# Patient Record
Sex: FEMALE | Race: WHITE | NOT HISPANIC OR LATINO
[De-identification: names, ages, dates, MRNs, and addresses within clinical notes are randomized per-mention and may not be internally consistent; named-entity substitution may affect disease eponyms.]

---

## 2019-10-17 ENCOUNTER — NON-APPOINTMENT (OUTPATIENT)
Age: 54
End: 2019-10-17

## 2019-10-17 ENCOUNTER — APPOINTMENT (OUTPATIENT)
Dept: OPHTHALMOLOGY | Facility: CLINIC | Age: 54
End: 2019-10-17
Payer: COMMERCIAL

## 2019-10-17 PROCEDURE — 99201 OFFICE OUTPATIENT NEW 10 MINUTES: CPT

## 2019-11-07 ENCOUNTER — RECORD ABSTRACTING (OUTPATIENT)
Age: 54
End: 2019-11-07

## 2019-11-07 DIAGNOSIS — J45.909 UNSPECIFIED ASTHMA, UNCOMPLICATED: ICD-10-CM

## 2019-11-07 DIAGNOSIS — Z80.3 FAMILY HISTORY OF MALIGNANT NEOPLASM OF BREAST: ICD-10-CM

## 2019-11-07 DIAGNOSIS — R39.11 HESITANCY OF MICTURITION: ICD-10-CM

## 2019-11-07 DIAGNOSIS — R73.03 PREDIABETES.: ICD-10-CM

## 2019-11-07 DIAGNOSIS — F32.9 MAJOR DEPRESSIVE DISORDER, SINGLE EPISODE, UNSPECIFIED: ICD-10-CM

## 2019-11-07 DIAGNOSIS — Q56.4 INDETERMINATE SEX, UNSPECIFIED: ICD-10-CM

## 2019-11-07 DIAGNOSIS — Z78.9 OTHER SPECIFIED HEALTH STATUS: ICD-10-CM

## 2019-11-07 DIAGNOSIS — Z87.442 PERSONAL HISTORY OF URINARY CALCULI: ICD-10-CM

## 2019-11-07 DIAGNOSIS — Z82.49 FAMILY HISTORY OF ISCHEMIC HEART DISEASE AND OTHER DISEASES OF THE CIRCULATORY SYSTEM: ICD-10-CM

## 2019-11-07 DIAGNOSIS — R35.0 FREQUENCY OF MICTURITION: ICD-10-CM

## 2019-11-07 RX ORDER — SERTRALINE HYDROCHLORIDE 25 MG/1
TABLET, FILM COATED ORAL
Refills: 0 | Status: ACTIVE | COMMUNITY

## 2019-11-07 RX ORDER — ARIPIPRAZOLE 2 MG/1
TABLET ORAL
Refills: 0 | Status: ACTIVE | COMMUNITY

## 2019-11-20 ENCOUNTER — APPOINTMENT (OUTPATIENT)
Dept: UROLOGY | Facility: CLINIC | Age: 54
End: 2019-11-20
Payer: COMMERCIAL

## 2019-11-20 DIAGNOSIS — Z80.0 FAMILY HISTORY OF MALIGNANT NEOPLASM OF DIGESTIVE ORGANS: ICD-10-CM

## 2019-11-20 DIAGNOSIS — N20.9 URINARY CALCULUS, UNSPECIFIED: ICD-10-CM

## 2019-11-20 DIAGNOSIS — Z00.00 ENCOUNTER FOR GENERAL ADULT MEDICAL EXAMINATION W/OUT ABNORMAL FINDINGS: ICD-10-CM

## 2019-11-20 DIAGNOSIS — R35.1 NOCTURIA: ICD-10-CM

## 2019-11-20 DIAGNOSIS — Z87.448 PERSONAL HISTORY OF OTHER DISEASES OF URINARY SYSTEM: ICD-10-CM

## 2019-11-20 LAB
BILIRUB UR QL STRIP: NORMAL
CLARITY UR: CLEAR
COLLECTION METHOD: NORMAL
GLUCOSE UR-MCNC: NORMAL
HCG UR QL: 0.2 EU/DL
HGB UR QL STRIP.AUTO: NORMAL
KETONES UR-MCNC: NORMAL
LEUKOCYTE ESTERASE UR QL STRIP: NORMAL
NITRITE UR QL STRIP: NORMAL
PH UR STRIP: 5.5
PROT UR STRIP-MCNC: NORMAL
SP GR UR STRIP: 1.02

## 2019-11-20 PROCEDURE — 99215 OFFICE O/P EST HI 40 MIN: CPT | Mod: 25

## 2019-11-20 PROCEDURE — 51798 US URINE CAPACITY MEASURE: CPT

## 2019-11-20 PROCEDURE — 81003 URINALYSIS AUTO W/O SCOPE: CPT | Mod: QW

## 2019-11-20 RX ORDER — ESTROGENS, CONJUGATED 0.62 MG/1
0.62 TABLET, FILM COATED ORAL
Refills: 0 | Status: ACTIVE | COMMUNITY

## 2019-11-20 RX ORDER — PREDNISOLONE SODIUM PHOSPHATE 1 %
DROPS OPHTHALMIC (EYE)
Refills: 0 | Status: ACTIVE | COMMUNITY

## 2019-11-20 RX ORDER — PROGESTERONE 100 MG/1
100 CAPSULE ORAL
Refills: 0 | Status: ACTIVE | COMMUNITY

## 2019-11-20 RX ORDER — PROGESTERONE 200 MG/1
CAPSULE ORAL
Refills: 0 | Status: DISCONTINUED | COMMUNITY
End: 2019-11-20

## 2019-11-21 NOTE — PHYSICAL EXAM
[General Appearance - Well Developed] : well developed [General Appearance - Well Nourished] : well nourished [Normal Appearance] : normal appearance [Well Groomed] : well groomed [General Appearance - In No Acute Distress] : no acute distress [Edema] : no peripheral edema [Respiration, Rhythm And Depth] : normal respiratory rhythm and effort [Exaggerated Use Of Accessory Muscles For Inspiration] : no accessory muscle use [Abdomen Soft] : soft [Abdomen Tenderness] : non-tender [Costovertebral Angle Tenderness] : no ~M costovertebral angle tenderness [FreeTextEntry1] : patient does not want to have a  exam  [Normal Station and Gait] : the gait and station were normal for the patient's age [] : no rash [No Focal Deficits] : no focal deficits [Oriented To Time, Place, And Person] : oriented to person, place, and time [Affect] : the affect was normal [Mood] : the mood was normal [Not Anxious] : not anxious [No Palpable Adenopathy] : no palpable adenopathy

## 2019-11-21 NOTE — ASSESSMENT
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  Last imaging done in 2017- small punctate stones.  Never has required surgical intervention for stone disease- presents for follow-up.  Pt continues to have "slow stream" which she states is better after she defecates.  We discussed her bowel regimen as a means to address.  She is empyting well with minimal PVR today and does not wish to have a physical exam.  On prior examinations she does have intact prostatic tissue and it is possible her outlet is contributing to her symptoms.  To address her stone hx I have ordered an ultrasound and will be in touch with her with the results.

## 2019-11-21 NOTE — HISTORY OF PRESENT ILLNESS
[FreeTextEntry1] : 54F, , (born with intersex condition), hx of multiple prior surgical procedures in the abdomen for stated. Prior patient of Dr. Avendano. Hx of urolithiasis since 25 yrs ago, small stones which likely passed. Last visit 2017 was noted to have B/L punctate non-obstructing stones which were followed with renal US. Of note, has been on Estrogen replacement therapy since teenage years.\par \par Urinary symptoms include same sensation of incomplete bladder emptying, nocturia 1-2x. Patient reports better flow after bowel movement. Flow is "light and slow". Denies recent gross hematuria. Last UTI two months ago, completed routine antibiotics. Today's dipstick pending.

## 2019-11-26 LAB — BACTERIA UR CULT: NORMAL

## 2020-10-15 ENCOUNTER — APPOINTMENT (OUTPATIENT)
Dept: UROLOGY | Facility: CLINIC | Age: 55
End: 2020-10-15
Payer: COMMERCIAL

## 2020-10-15 VITALS — SYSTOLIC BLOOD PRESSURE: 145 MMHG | HEART RATE: 75 BPM | DIASTOLIC BLOOD PRESSURE: 90 MMHG | TEMPERATURE: 97.1 F

## 2020-10-15 PROCEDURE — 81003 URINALYSIS AUTO W/O SCOPE: CPT | Mod: QW

## 2020-10-15 PROCEDURE — 99213 OFFICE O/P EST LOW 20 MIN: CPT

## 2020-10-15 NOTE — PHYSICAL EXAM
[Normal Appearance] : normal appearance [General Appearance - Well Nourished] : well nourished [General Appearance - Well Developed] : well developed [Well Groomed] : well groomed [General Appearance - In No Acute Distress] : no acute distress [Abdomen Tenderness] : non-tender [Abdomen Soft] : soft [Costovertebral Angle Tenderness] : no ~M costovertebral angle tenderness [Edema] : no peripheral edema [Respiration, Rhythm And Depth] : normal respiratory rhythm and effort [] : no respiratory distress [Affect] : the affect was normal [Oriented To Time, Place, And Person] : oriented to person, place, and time [Exaggerated Use Of Accessory Muscles For Inspiration] : no accessory muscle use [Normal Station and Gait] : the gait and station were normal for the patient's age [Mood] : the mood was normal [Not Anxious] : not anxious [No Focal Deficits] : no focal deficits [No Palpable Adenopathy] : no palpable adenopathy

## 2020-10-16 LAB
BILIRUB UR QL STRIP: NORMAL
CLARITY UR: CLEAR
COLLECTION METHOD: NORMAL
GLUCOSE UR-MCNC: NORMAL
HCG UR QL: 0.2 EU/DL
HGB UR QL STRIP.AUTO: NORMAL
KETONES UR-MCNC: NORMAL
LEUKOCYTE ESTERASE UR QL STRIP: NORMAL
NITRITE UR QL STRIP: NORMAL
PH UR STRIP: 5.5
PROT UR STRIP-MCNC: NORMAL
SP GR UR STRIP: 1.01

## 2020-10-22 ENCOUNTER — APPOINTMENT (OUTPATIENT)
Dept: UROLOGY | Facility: CLINIC | Age: 55
End: 2020-10-22
Payer: COMMERCIAL

## 2020-10-22 VITALS
BODY MASS INDEX: 36.57 KG/M2 | DIASTOLIC BLOOD PRESSURE: 86 MMHG | HEIGHT: 72 IN | HEART RATE: 69 BPM | SYSTOLIC BLOOD PRESSURE: 127 MMHG | WEIGHT: 270 LBS | TEMPERATURE: 97.7 F

## 2020-10-22 LAB — BACTERIA UR CULT: NORMAL

## 2020-10-22 PROCEDURE — 99072 ADDL SUPL MATRL&STAF TM PHE: CPT

## 2020-10-22 PROCEDURE — 99214 OFFICE O/P EST MOD 30 MIN: CPT

## 2020-10-22 NOTE — HISTORY OF PRESENT ILLNESS
[FreeTextEntry1] : 55F, , (born intersex), hx of multiple prior surgical procedures. She is a former patient of Dr. Avendano. Hx of urolithiasis since 26 yrs ago, small stones which likely passed.  Most recent imaging demonstrated bilateral worsening hydronephrosis with right renal lower pole stone. Prior imaging completed on 2017 notable for B/L punctate non-obstructing stones which were followed with renal US. Of note, has been on Estrogen replacement therapy since teenage years. \par \par Today she reports for right toe pain. She states she woke up in the middle of the night with right great toe pain which she believes to be gout. States her toe was hot and red but has since improved. She is concerned because of her recent high uric acid levels and kidney stone formation. She has never had an episode of gout before. \par \par Urinary symptoms include same sensation of incomplete bladder emptying, nocturia 1-2x. Patient reports better flow after bowel movement. Flow is "light and slow". Denies recent gross hematuria. \par \par Urine culture 10/15/20: negative

## 2020-10-22 NOTE — ASSESSMENT
[FreeTextEntry1] : 56 yo female born intersex with hx bilateral stones who presents with recent renal US demonstrating bilateral worsening hydronephrosis with renal stone in right renal lower pole.  She has never required surgical intervention for stone disease in the past. CT abd + pelvis to evaluate Hounsfield units of stones is pending.   \par \par For toe symptoms  I have suggested she see her PMD or podiatrist.

## 2020-10-22 NOTE — PHYSICAL EXAM
[General Appearance - Well Developed] : well developed [General Appearance - Well Nourished] : well nourished [Normal Appearance] : normal appearance [Well Groomed] : well groomed [General Appearance - In No Acute Distress] : no acute distress [Abdomen Soft] : soft [Abdomen Tenderness] : non-tender [Costovertebral Angle Tenderness] : no ~M costovertebral angle tenderness [Edema] : no peripheral edema [] : no respiratory distress [Respiration, Rhythm And Depth] : normal respiratory rhythm and effort [Exaggerated Use Of Accessory Muscles For Inspiration] : no accessory muscle use [Oriented To Time, Place, And Person] : oriented to person, place, and time [Affect] : the affect was normal [Mood] : the mood was normal [Not Anxious] : not anxious [No Focal Deficits] : no focal deficits [FreeTextEntry1] : Right mildly tender great metatarsal joint, not erythematous,cool to touch

## 2020-11-03 ENCOUNTER — NON-APPOINTMENT (OUTPATIENT)
Age: 55
End: 2020-11-03

## 2020-12-03 NOTE — ASSESSMENT
[FreeTextEntry1] : 56 yo female born intersex with hx bilateral stones who presents with recent renal US demonstrating bilateral worsening hydronephrosis with renal stone in right renal lower pole.  She has never required surgical intervention for stone disease in the past.  Pt continues to have "slow stream" which she states is better after she defecates.  Will send urine for culture today. She has intact prostatic tissue, will schedule CT abd + pelvis to evaluate Hounsfield units of stones, will call pt with results to further discuss options for stone treatment.

## 2020-12-03 NOTE — HISTORY OF PRESENT ILLNESS
[FreeTextEntry1] : 55F, , (born with intersex condition), hx of multiple prior surgical procedures in the abdomen. Prior patient of Dr. Avendano. Hx of urolithiasis since 26 yrs ago, small stones which likely passed.  Most recent imaging demonstrated bilateral worsening hydronephrosis with right renal lower pole stone. Prior imaging completed on 2017 notable for B/L punctate non-obstructing stones which were followed with renal US. Of note, has been on Estrogen replacement therapy since teenage years. \par \par Urinary symptoms include same sensation of incomplete bladder emptying, nocturia 1-2x. Patient reports better flow after bowel movement. Flow is "light and slow". Denies recent gross hematuria.

## 2020-12-07 ENCOUNTER — APPOINTMENT (OUTPATIENT)
Dept: UROLOGY | Facility: CLINIC | Age: 55
End: 2020-12-07
Payer: COMMERCIAL

## 2020-12-07 PROCEDURE — 99214 OFFICE O/P EST MOD 30 MIN: CPT | Mod: 95

## 2020-12-07 RX ORDER — POTASSIUM CITRATE 15 MEQ/1
15 MEQ TABLET, EXTENDED RELEASE ORAL
Qty: 60 | Refills: 11 | Status: ACTIVE | COMMUNITY
Start: 2020-12-07 | End: 1900-01-01

## 2020-12-09 NOTE — ASSESSMENT
[FreeTextEntry1] : 56 yo female born intersex with recent CT scan demonstrating R>L extrarenal pelves and multiple small 3-4 mm stones in right kidney.  We discussed options.  Given her urethral anatomy, she is concerned about the ability to have an urgent procedure should the need arise for one of these small stones.  Given the density of the stones, I have started her on potassium citrate and plan to repeat a CT non-contrast in 3 months.

## 2020-12-09 NOTE — HISTORY OF PRESENT ILLNESS
[Home] : at home, [unfilled] , at the time of the visit. [Medical Office: ([facility])___] : at the medical office located in  [Verbal consent obtained from patient] : the patient, [unfilled] [FreeTextEntry1] : 55F, , (born intersex), hx of multiple prior surgical procedures. She presents via telehealth to discuss recent 24 hour urine results.  \par \par History: \par She is a former patient of Dr. Avendano. Hx of urolithiasis >25 yrs ago, small stones which likely passed.  Most recent imaging demonstrated bilateral worsening hydronephrosis with right renal lower pole stone. Prior imaging completed on 2017 notable for B/L punctate non-obstructing stones which were followed with renal US. Of note, has been on Estrogen replacement therapy since teenage years. \par \par Urinary symptoms include same sensation of incomplete bladder emptying, nocturia 1-2x. Patient reports better flow after bowel movement. Flow is "light and slow". Denies recent gross hematuria. \par \par Urine culture 10/15/20: negative

## 2021-02-27 ENCOUNTER — NON-APPOINTMENT (OUTPATIENT)
Age: 56
End: 2021-02-27

## 2021-03-11 ENCOUNTER — APPOINTMENT (OUTPATIENT)
Dept: UROLOGY | Facility: CLINIC | Age: 56
End: 2021-03-11
Payer: COMMERCIAL

## 2021-03-11 PROCEDURE — 99214 OFFICE O/P EST MOD 30 MIN: CPT | Mod: 95

## 2021-03-11 NOTE — HISTORY OF PRESENT ILLNESS
[Other Location: e.g. School (Enter Location, City,State)___] : at [unfilled], at the time of the visit. [FreeTextEntry1] : 55F, , (born intersex), hx of multiple prior surgical procedures. She presented 2020 via telehealth to discuss recent 24 hour urine results. Given the density of the stones, started her on potassium citrate. She returns to discuss abd+pelvis CT scan results 2021 which demonstrates that stones have not changed in size. Had one episode of heartburn secondary to potassium citrate last week. Has been taking potassium citrate BID until this episode and has not taken it since. Of note, pt is having a double mastectomy in two weeks. She is not currently taking estrogen, but is still taking progesterone. \par \par CT abd+ pelvis 2021: 4 mm right mid kidney nonobstructing stone, 3 mm right lower pole nonobstructing stone, 1 mm right mid kidney/lower pole nonobstructing stone,and 3 additional 1 mm right upper pole renal stones. Two upper pole 3 mm nonobstructing stones. \par 24 hour urine collection 2020: 332 mg/day urine calcium, , 0.866 uric acid (g/day)\par \par History: \par She is a former patient of Dr. Avendano. Hx of urolithiasis >25 yrs ago, small stones which likely passed.  Most recent imaging demonstrated bilateral worsening hydronephrosis with right renal lower pole stone. Prior imaging completed on 2017 notable for B/L punctate non-obstructing stones which were followed with renal US. Of note, has been on Estrogen replacement therapy since teenage years. \par \par Urinary symptoms include same sensation of incomplete bladder emptying, nocturia 1-2x. Patient reported better flow after bowel movement. Flow was "light and slow". Denied recent gross hematuria. \par

## 2021-03-11 NOTE — LETTER BODY
[Dear  ___] : Dear  [unfilled], [Consult Letter:] : I had the pleasure of evaluating your patient, [unfilled]. [Please see my note below.] : Please see my note below. [Consult Closing:] : Thank you very much for allowing me to participate in the care of this patient.  If you have any questions, please do not hesitate to contact me. [Sincerely,] : Sincerely, [FreeTextEntry3] : Dr. Gisella Gutierres

## 2021-03-11 NOTE — ASSESSMENT
[FreeTextEntry1] : 54 yo female born intersex with initial CT scan demonstrating R>L extrarenal pelves and multiple small 3-4 mm stones in right kidney. We discussed options, she wishes to d/c potassium citrate until after surgery. She will repeat 24 urine collection and schedule televisit to discuss results. \par \par Patient expressed understanding. \par

## 2021-03-17 ENCOUNTER — APPOINTMENT (OUTPATIENT)
Dept: OPHTHALMOLOGY | Facility: CLINIC | Age: 56
End: 2021-03-17
Payer: COMMERCIAL

## 2021-03-17 ENCOUNTER — NON-APPOINTMENT (OUTPATIENT)
Age: 56
End: 2021-03-17

## 2021-03-17 PROCEDURE — 92025 CPTRIZED CORNEAL TOPOGRAPHY: CPT

## 2021-03-17 PROCEDURE — 92286 ANT SGM IMG I&R SPECLR MIC: CPT

## 2021-03-17 PROCEDURE — 92014 COMPRE OPH EXAM EST PT 1/>: CPT

## 2021-03-17 PROCEDURE — 99072 ADDL SUPL MATRL&STAF TM PHE: CPT

## 2021-03-17 PROCEDURE — 92250 FUNDUS PHOTOGRAPHY W/I&R: CPT

## 2021-04-14 ENCOUNTER — APPOINTMENT (OUTPATIENT)
Dept: UROLOGY | Facility: CLINIC | Age: 56
End: 2021-04-14
Payer: COMMERCIAL

## 2021-04-14 VITALS — SYSTOLIC BLOOD PRESSURE: 157 MMHG | DIASTOLIC BLOOD PRESSURE: 103 MMHG | HEART RATE: 65 BPM | TEMPERATURE: 97.9 F

## 2021-04-14 LAB
BILIRUB UR QL STRIP: NORMAL
CLARITY UR: CLEAR
COLLECTION METHOD: NORMAL
GLUCOSE UR-MCNC: NORMAL
HCG UR QL: 0.2 EU/DL
HGB UR QL STRIP.AUTO: NORMAL
KETONES UR-MCNC: NORMAL
LEUKOCYTE ESTERASE UR QL STRIP: NORMAL
NITRITE UR QL STRIP: NORMAL
PH UR STRIP: 6
PROT UR STRIP-MCNC: NORMAL
SP GR UR STRIP: 1.01

## 2021-04-14 PROCEDURE — 99072 ADDL SUPL MATRL&STAF TM PHE: CPT

## 2021-04-14 PROCEDURE — 99215 OFFICE O/P EST HI 40 MIN: CPT

## 2021-04-14 PROCEDURE — 51798 US URINE CAPACITY MEASURE: CPT

## 2021-04-14 NOTE — PHYSICAL EXAM
[General Appearance - Well Developed] : well developed [General Appearance - Well Nourished] : well nourished [Normal Appearance] : normal appearance [Well Groomed] : well groomed [General Appearance - In No Acute Distress] : no acute distress [Edema] : no peripheral edema [] : no respiratory distress [Exaggerated Use Of Accessory Muscles For Inspiration] : no accessory muscle use [Oriented To Time, Place, And Person] : oriented to person, place, and time [Affect] : the affect was normal [Mood] : the mood was normal [Not Anxious] : not anxious [Normal Station and Gait] : the gait and station were normal for the patient's age [No Focal Deficits] : no focal deficits

## 2021-04-15 ENCOUNTER — APPOINTMENT (OUTPATIENT)
Dept: UROLOGY | Facility: CLINIC | Age: 56
End: 2021-04-15

## 2021-04-16 RX ORDER — CIPROFLOXACIN HYDROCHLORIDE 500 MG/1
500 TABLET, FILM COATED ORAL
Qty: 6 | Refills: 0 | Status: ACTIVE | COMMUNITY
Start: 2021-04-16 | End: 1900-01-01

## 2021-04-20 NOTE — HISTORY OF PRESENT ILLNESS
[FreeTextEntry1] : 55F, , (born intersex), hx of multiple prior surgical procedures presenting with complaints of a urinary tract infection that began four days s/p double mastectomy.  Denies catheter placement at time of surgery. Reports constipation s/p surgery.  Once UTI symptoms began, she self-medicated with Macrobid BID x 7 days, which moderately improved symptoms at the time. When symptoms returned, she presented to a [facility] 2021 and UCx (+) for E.coli. UA at this time also significant for microscopic hematuria. She was then treated with Macrobid qd x 6 days. She is on sixth day of abx today and still reports UTI symptoms. Last UTI was 6 months ago.\par \par Pt also has longstanding incomplete bladder emptying complaints with worsening weak flow. Reports straining to void with full bladder. She states that she has no upcoming surgery and is no longer on hormone replacement therapy. Denies atrophy or hot flashes. \par \par Udip: negative\par CT abd+ pelvis 2021: 4 mm right mid kidney nonobstructing stone, 3 mm right lower pole nonobstructing stone, 1 mm right mid kidney/lower pole nonobstructing stone,and 3 additional 1 mm right upper pole renal stones. Two upper pole 3 mm nonobstructing stones. \par 24 hour urine collection 2020: 332 mg/day urine calcium, , 0.866 uric acid (g/day)\par \par History: \par She is a former patient of Dr. Avendano. Hx of urolithiasis >25 yrs ago, small stones which likely passed.  Most recent imaging demonstrated bilateral worsening hydronephrosis with right renal lower pole stone. Prior imaging completed on 2017 notable for B/L punctate non-obstructing stones which were followed with renal US. Of note, has been on Estrogen replacement therapy since teenage years. \par \par Urinary symptoms include same sensation of incomplete bladder emptying, nocturia 1-2x. Patient reported better flow after bowel movement. Flow was "light and slow". Denied recent gross hematuria. \par \par She presented 2020 via telehealth to discuss recent 24 hour urine results. Given the density of the stones, started her on potassium citrate. She returned 3/11/2021 to discuss abd+pelvis CT scan results 2021 which demonstrated that stones have not changed in size. Had one episode of heartburn secondary to potassium citrate week prior. Had been taking potassium citrate BID until this episode and has not taken it since. Of note, pt was having a double mastectomy in two weeks. She was not taking estrogen, but was still taking progesterone. \par

## 2021-04-20 NOTE — ASSESSMENT
[FreeTextEntry1] : 54 yo female born intersex with initial CT scan demonstrating R>L extrarenal pelves and multiple small 3-4 mm stones in right kidney. She returns with complaints of a urinary tract infection that began four days s/p double mastectomy. Will send urine for culture and send rx Cipro to pharmacy. She will have a repeat UCx at outside lab Friday. If still symptomatic at this point, she will start abx to empirically treat UTI while awaiting UCx results.\par \par We discussed behavioral and dietary modifications to prevent further stone formation as well as manual compression of bladder during void. We will recheck PVR and discuss next steps in 2-3 months. Pt understands possibility of cystoscopy if microscopic hematuria continues outside setting of infection.\par \par Patient expressed understanding. \par \par PVR #1: 185 cc\par PVR #2: 68 cc

## 2021-04-22 ENCOUNTER — APPOINTMENT (OUTPATIENT)
Dept: UROLOGY | Facility: CLINIC | Age: 56
End: 2021-04-22
Payer: COMMERCIAL

## 2021-04-22 VITALS — TEMPERATURE: 97.8 F

## 2021-04-22 PROCEDURE — 99214 OFFICE O/P EST MOD 30 MIN: CPT

## 2021-04-22 PROCEDURE — 99072 ADDL SUPL MATRL&STAF TM PHE: CPT

## 2021-04-22 RX ORDER — TAMSULOSIN HYDROCHLORIDE 0.4 MG/1
0.4 CAPSULE ORAL
Qty: 10 | Refills: 1 | Status: ACTIVE | COMMUNITY
Start: 2021-04-22 | End: 1900-01-01

## 2021-04-22 NOTE — PHYSICAL EXAM
[General Appearance - Well Developed] : well developed [General Appearance - Well Nourished] : well nourished [Normal Appearance] : normal appearance [Well Groomed] : well groomed [General Appearance - In No Acute Distress] : no acute distress [Edema] : no peripheral edema [] : no respiratory distress [Exaggerated Use Of Accessory Muscles For Inspiration] : no accessory muscle use [Affect] : the affect was normal [Oriented To Time, Place, And Person] : oriented to person, place, and time [Mood] : the mood was normal [Not Anxious] : not anxious [Normal Station and Gait] : the gait and station were normal for the patient's age [No Focal Deficits] : no focal deficits

## 2021-04-26 ENCOUNTER — NON-APPOINTMENT (OUTPATIENT)
Age: 56
End: 2021-04-26

## 2021-04-26 LAB
BACTERIA UR CULT: NORMAL
BILIRUB UR QL STRIP: NORMAL
CLARITY UR: CLEAR
COLLECTION METHOD: NORMAL
GLUCOSE UR-MCNC: NORMAL
HCG UR QL: 0.2 EU/DL
HGB UR QL STRIP.AUTO: NORMAL
KETONES UR-MCNC: NORMAL
LEUKOCYTE ESTERASE UR QL STRIP: NORMAL
NITRITE UR QL STRIP: NORMAL
PH UR STRIP: 5.5
PROT UR STRIP-MCNC: NORMAL
SP GR UR STRIP: 1.01

## 2021-04-26 NOTE — ASSESSMENT
[FreeTextEntry1] : 54 yo female born intersex with initial CT scan demonstrating R>L extrarenal pelves and multiple small 3-4 mm stones in right kidney. Will send urine via straight cath sample for culture. Pt will call Monday for the results. We agreed on a short course of Flomax to see if that helped with bladder emptying complaints.  \par \par Patient expressed understanding. \par

## 2021-04-26 NOTE — HISTORY OF PRESENT ILLNESS
[FreeTextEntry1] : 55F, , (born intersex), hx of multiple prior surgical procedures presenting for straight catheterization. \par \par Udip: negative\par UCx 2021: negative\par CT abd+ pelvis 2021: 4 mm right mid kidney nonobstructing stone, 3 mm right lower pole nonobstructing stone, 1 mm right mid kidney/lower pole nonobstructing stone,and 3 additional 1 mm right upper pole renal stones. Two upper pole 3 mm nonobstructing stones. \par 24 hour urine collection 2020: 332 mg/day urine calcium, , 0.866 uric acid (g/day)\par \par History: \par She is a former patient of Dr. Avendano. Hx of urolithiasis >25 yrs ago, small stones which likely passed.  Most recent imaging demonstrated bilateral worsening hydronephrosis with right renal lower pole stone. Prior imaging completed on 2017 notable for B/L punctate non-obstructing stones which were followed with renal US. Of note, has been on Estrogen replacement therapy since teenage years. \par \par Urinary symptoms include same sensation of incomplete bladder emptying, nocturia 1-2x. Patient reported better flow after bowel movement. Flow was "light and slow". Denied recent gross hematuria. \par \par She presented 2020 via telehealth to discuss recent 24 hour urine results. Given the density of the stones, started her on potassium citrate. She returned 3/11/2021 to discuss abd+pelvis CT scan results 2021 which demonstrated that stones have not changed in size. Had one episode of heartburn secondary to potassium citrate week prior. Had been taking potassium citrate BID until this episode and has not taken it since. Of note, pt was having a double mastectomy in two weeks. She was not taking estrogen, but was still taking progesterone. \par \par 2021 she presented with complaints of a urinary tract infection that began four days s/p double mastectomy.  Denied catheter placement at time of surgery. Reported constipation s/p surgery.  Once UTI symptoms began, she self-medicated with Macrobid BID x 7 days, which moderately improved symptoms at the time. When symptoms returned, she presented to a [facility] 2021 and UCx (+) for E.coli. UA at this time also significant for microscopic hematuria. She was then treated with Macrobid qd x 6 days. She was on sixth day of abx and still reported UTI symptoms. Last UTI was 6 months ago. She was empirically treated with 500 mg BID Cipro x 3 days. \par \par Pt also has longstanding incomplete bladder emptying complaints with worsening weak flow. Reported straining to void with full bladder. She stated that she has no upcoming surgery and is no longer on hormone replacement therapy. Denied atrophy or hot flashes. \par

## 2021-04-29 ENCOUNTER — APPOINTMENT (OUTPATIENT)
Dept: UROLOGY | Facility: CLINIC | Age: 56
End: 2021-04-29
Payer: COMMERCIAL

## 2021-04-29 VITALS — DIASTOLIC BLOOD PRESSURE: 89 MMHG | TEMPERATURE: 97.8 F | SYSTOLIC BLOOD PRESSURE: 151 MMHG | HEART RATE: 80 BPM

## 2021-04-29 PROCEDURE — 99072 ADDL SUPL MATRL&STAF TM PHE: CPT

## 2021-04-29 PROCEDURE — 51797 INTRAABDOMINAL PRESSURE TEST: CPT

## 2021-04-29 PROCEDURE — 51741 ELECTRO-UROFLOWMETRY FIRST: CPT

## 2021-04-29 PROCEDURE — 51784 ANAL/URINARY MUSCLE STUDY: CPT

## 2021-04-29 PROCEDURE — 51728 CYSTOMETROGRAM W/VP: CPT

## 2021-04-29 PROCEDURE — 99215 OFFICE O/P EST HI 40 MIN: CPT | Mod: 25

## 2021-04-29 RX ORDER — NITROFURANTOIN (MONOHYDRATE/MACROCRYSTALS) 25; 75 MG/1; MG/1
100 CAPSULE ORAL
Qty: 10 | Refills: 6 | Status: ACTIVE | COMMUNITY
Start: 2021-04-29 | End: 1900-01-01

## 2021-04-30 LAB
BILIRUB UR QL STRIP: NORMAL
CLARITY UR: CLEAR
COLLECTION METHOD: NORMAL
GLUCOSE UR-MCNC: NORMAL
HCG UR QL: 0.2 EU/DL
HGB UR QL STRIP.AUTO: NORMAL
KETONES UR-MCNC: NORMAL
LEUKOCYTE ESTERASE UR QL STRIP: NORMAL
NITRITE UR QL STRIP: NORMAL
PH UR STRIP: 7
PROT UR STRIP-MCNC: NORMAL
SP GR UR STRIP: 1.01

## 2021-05-04 LAB — BACTERIA UR CULT: NORMAL

## 2021-05-05 NOTE — HISTORY OF PRESENT ILLNESS
[FreeTextEntry1] : 55F, , (born intersex), hx of multiple prior surgical procedures presenting for urodynamics. \par \par Udip: negative\par UCx 2021: negative\par CT abd+ pelvis 2021: 4 mm right mid kidney nonobstructing stone, 3 mm right lower pole nonobstructing stone, 1 mm right mid kidney/lower pole nonobstructing stone,and 3 additional 1 mm right upper pole renal stones. Two upper pole 3 mm nonobstructing stones. \par 24 hour urine collection 2020: 332 mg/day urine calcium, , 0.866 uric acid (g/day)\par \par History: \par She is a former patient of Dr. Avendano. Hx of urolithiasis >25 yrs ago, small stones which likely passed.  Most recent imaging demonstrated bilateral worsening hydronephrosis with right renal lower pole stone. Prior imaging completed on 2017 notable for B/L punctate non-obstructing stones which were followed with renal US. Of note, has been on Estrogen replacement therapy since teenage years. \par \par Urinary symptoms include same sensation of incomplete bladder emptying, nocturia 1-2x. Patient reported better flow after bowel movement. Flow was "light and slow". Denied recent gross hematuria. \par \par She presented 2020 via telehealth to discuss recent 24 hour urine results. Given the density of the stones, started her on potassium citrate. She returned 3/11/2021 to discuss abd+pelvis CT scan results 2021 which demonstrated that stones have not changed in size. Had one episode of heartburn secondary to potassium citrate week prior. Had been taking potassium citrate BID until this episode and has not taken it since. Of note, pt was having a double mastectomy in two weeks. She was not taking estrogen, but was still taking progesterone. \par \par 2021 she presented with complaints of a urinary tract infection that began four days s/p double mastectomy.  Denied catheter placement at time of surgery. Reported constipation s/p surgery.  Once UTI symptoms began, she self-medicated with Macrobid BID x 7 days, which moderately improved symptoms at the time. When symptoms returned, she presented to a [facility] 2021 and UCx (+) for E.coli. UA at this time also significant for microscopic hematuria. She was then treated with Macrobid qd x 6 days. She was on sixth day of abx and still reported UTI symptoms. Last UTI was 6 months ago. She was empirically treated with 500 mg BID Cipro x 3 days. \par \par Pt also has longstanding incomplete bladder emptying complaints with worsening weak flow. Reported straining to void with full bladder. She stated that she has no upcoming surgery and is no longer on hormone replacement therapy. Denied atrophy or hot flashes. \par \par 2021: She presented for straight catheterization. We agreed on a short course of Flomax to see if that helped with bladder emptying complaints.  Pt later called moderate improvement on Flomax, specified worse when constipating.

## 2021-05-05 NOTE — ASSESSMENT
[FreeTextEntry1] : 54 yo female born intersex with initial CT scan demonstrating R>L extrarenal pelves and multiple small 3-4 mm stones in right kidney. She returns for urodynamics. Urodynamics demonstrated a large capacity bladder with an interrupted flow.   Evidence of valsalva voiding and impaired contractility.   The flomax does not appear to be helping much so she will stop.  I will be in touch with the urine culture results.  \par \par She tolerated the procedure well. \par \par Patient expressed understanding. \par

## 2021-05-06 ENCOUNTER — APPOINTMENT (OUTPATIENT)
Dept: UROLOGY | Facility: CLINIC | Age: 56
End: 2021-05-06
Payer: COMMERCIAL

## 2021-05-06 PROCEDURE — 99213 OFFICE O/P EST LOW 20 MIN: CPT | Mod: 95

## 2021-05-06 NOTE — ASSESSMENT
[FreeTextEntry1] : 56 yo female born intersex with initial CT scan demonstrating R>L extrarenal pelves and multiple small 3-4 mm stones in right kidney. Urodynamics demonstrated a large capacity bladder with an interrupted flow. Evidence of valsalva voiding and impaired contractility.  She reports improvement in bladder symptoms. She will continue taking MiraLAX to aid BM, avoid bladder irritants, and drink plenty of fluids. She will return in a month to recheck PVR. \par \par Patient expressed understanding. \par

## 2021-05-06 NOTE — HISTORY OF PRESENT ILLNESS
[Other Location: e.g. School (Enter Location, City,State)___] : at [unfilled], at the time of the visit. [Verbal consent obtained from patient] : the patient, [unfilled] [FreeTextEntry1] : 55F, , (born intersex), hx of multiple prior surgical procedures presenting for follow-up. She states that she was constipated yesterday and felt pubic discomfort, unsure if discomfort was vaginal or urinary. Reports discharge as well. She has since had several BM and no longer feels as "distended." She feels well today and states that she is emptying well. \par \par UCx 2021: negative\par CT abd+ pelvis 2021: 4 mm right mid kidney nonobstructing stone, 3 mm right lower pole nonobstructing stone, 1 mm right mid kidney/lower pole nonobstructing stone,and 3 additional 1 mm right upper pole renal stones. Two upper pole 3 mm nonobstructing stones. \par 24 hour urine collection 2020: 332 mg/day urine calcium, , 0.866 uric acid (g/day)\par \par History: \par She is a former patient of Dr. Avendano. Hx of urolithiasis >25 yrs ago, small stones which likely passed.  Most recent imaging demonstrated bilateral worsening hydronephrosis with right renal lower pole stone. Prior imaging completed on 2017 notable for B/L punctate non-obstructing stones which were followed with renal US. Of note, has been on Estrogen replacement therapy since teenage years. \par \par Urinary symptoms include same sensation of incomplete bladder emptying, nocturia 1-2x. Patient reported better flow after bowel movement. Flow was "light and slow". Denied recent gross hematuria. \par \par She presented 2020 via telehealth to discuss recent 24 hour urine results. Given the density of the stones, started her on potassium citrate. She returned 3/11/2021 to discuss abd+pelvis CT scan results 2021 which demonstrated that stones have not changed in size. Had one episode of heartburn secondary to potassium citrate week prior. Had been taking potassium citrate BID until this episode and has not taken it since. Of note, pt was having a double mastectomy in two weeks. She was not taking estrogen, but was still taking progesterone. \par \par 2021 she presented with complaints of a urinary tract infection that began four days s/p double mastectomy.  Denied catheter placement at time of surgery. Reported constipation s/p surgery.  Once UTI symptoms began, she self-medicated with Macrobid BID x 7 days, which moderately improved symptoms at the time. When symptoms returned, she presented to a [facility] 2021 and UCx (+) for E.coli. UA at this time also significant for microscopic hematuria. She was then treated with Macrobid qd x 6 days. She was on sixth day of abx and still reported UTI symptoms. Last UTI was 6 months ago. She was empirically treated with 500 mg BID Cipro x 3 days. \par \par Pt also has longstanding incomplete bladder emptying complaints with worsening weak flow. Reported straining to void with full bladder. She stated that she has no upcoming surgery and is no longer on hormone replacement therapy. Denied atrophy or hot flashes. \par \par 2021: She presented for straight catheterization. We agreed on a short course of Flomax to see if that helped with bladder emptying complaints.  Pt later called moderate improvement on Flomax, specified worse when constipated.\par \par  2021 she presented for urodynamics. Urodynamics demonstrated a large capacity bladder with an interrupted flow. Evidence of valsalva voiding and impaired contractility.  The flomax does not appear to be helping much so she will stop. \par

## 2021-06-08 ENCOUNTER — APPOINTMENT (OUTPATIENT)
Dept: UROLOGY | Facility: CLINIC | Age: 56
End: 2021-06-08
Payer: COMMERCIAL

## 2021-06-08 ENCOUNTER — RESULT CHARGE (OUTPATIENT)
Age: 56
End: 2021-06-08

## 2021-06-08 VITALS
SYSTOLIC BLOOD PRESSURE: 138 MMHG | HEART RATE: 74 BPM | OXYGEN SATURATION: 97 % | DIASTOLIC BLOOD PRESSURE: 87 MMHG | TEMPERATURE: 97.2 F

## 2021-06-08 PROCEDURE — 99213 OFFICE O/P EST LOW 20 MIN: CPT

## 2021-06-08 PROCEDURE — 51798 US URINE CAPACITY MEASURE: CPT

## 2021-06-09 NOTE — ASSESSMENT
[FreeTextEntry1] : 54 yo female born intersex with initial CT scan demonstrating R>L extrarenal pelves and multiple small 3-4 mm stones in right kidney. Recent recurrent UTI's and worsening incomplete bladder emptying now resolved.  Urodynamics demonstrated a large capacity bladder with an interrupted flow. Evidence of valsalva voiding and impaired contractility. Will send urine for culture to confirm no infection. \par \par Encouraged him to f/u with genital reassignment surgeon for further evaluation/discussion re options. \par \par Patient expressed understanding. \par \par PVR: 12 cc (done to rule out incomplete bladder emptying) \par

## 2021-06-09 NOTE — HISTORY OF PRESENT ILLNESS
[FreeTextEntry1] : 55F, , (born intersex), hx of multiple prior surgical procedures presenting to check PVR. He is feeling well today with no current urinary complaints. He has been taking MiraLAX in setting constipation and reports improvement in BM.  Reports improvement in mental health s/p bilateral mastectomy. He questions if he can pursue phalloplasty with current genitourinary anatomy. \par \par Of note, pt prefers to be called Shayan.\par \par Udip: negative\par UCx 2021: negative\par CT abd+ pelvis 2021: 4 mm right mid kidney nonobstructing stone, 3 mm right lower pole nonobstructing stone, 1 mm right mid kidney/lower pole nonobstructing stone,and 3 additional 1 mm right upper pole renal stones. Two upper pole 3 mm nonobstructing stones. \par 24 hour urine collection 2020: 332 mg/day urine calcium, , 0.866 uric acid (g/day)\par \par History: \par He is a former patient of Dr. Avendano. Hx of urolithiasis >25 yrs ago, small stones which likely passed.  Most recent imaging demonstrated bilateral worsening hydronephrosis with right renal lower pole stone. Prior imaging completed on 2017 notable for B/L punctate non-obstructing stones which were followed with renal US. Of note, had been on Estrogen replacement therapy since teenage years. \par \par Urinary symptoms include same sensation of incomplete bladder emptying, nocturia 1-2x. Patient reported better flow after bowel movement. Flow was "light and slow". Denied recent gross hematuria. \par \par He presented 2020 via telehealth to discuss recent 24 hour urine results. Given the density of the stones, started her on potassium citrate. He returned 3/11/2021 to discuss abd+pelvis CT scan results 2021 which demonstrated that stones have not changed in size. Had one episode of heartburn secondary to potassium citrate week prior. Had been taking potassium citrate BID until this episode and has not taken it since. Of note, pt was having a double mastectomy in two weeks. He was not taking estrogen, but was still taking progesterone. \par \par 2021 he presented with complaints of a urinary tract infection that began four days s/p double mastectomy.  Denied catheter placement at time of surgery. Reported constipation s/p surgery.  Once UTI symptoms began, he self-medicated with Macrobid BID x 7 days, which moderately improved symptoms at the time. When symptoms returned, he presented to a [facility] 2021 and UCx (+) for E.coli. UA at this time also significant for microscopic hematuria. He was then treated with Macrobid qd x 6 days. He was on sixth day of abx and still reported UTI symptoms. Last UTI was 6 months ago. He was empirically treated with 500 mg BID Cipro x 3 days. \par \par Pt also has longstanding incomplete bladder emptying complaints with worsening weak flow. Reported straining to void with full bladder. He stated that he has no upcoming surgery and is no longer on hormone replacement therapy. Denied atrophy or hot flashes. \par \par 2021: He presented for straight catheterization. We agreed on a short course of Flomax to see if that helped with bladder emptying complaints.  Pt later called moderate improvement on Flomax, specified worse when constipated.\par \par  2021 he presented for urodynamics. Urodynamics demonstrated a large capacity bladder with an interrupted flow. Evidence of valsalva voiding and impaired contractility.  The flomax does not appear to be helping much so she will stop. \par \par He presented for follow-up (2021). He stated that he was constipated the day prior and felt pubic discomfort, unsure if discomfort was vaginal or urinary. Reported discharge as well. He had since had several BM and no longer felt as "distended." He felt well during visit and stated that he had emptied well. He reported improvement in bladder symptoms. He will continue taking MiraLAX to aid BM, avoid bladder irritants, and drink plenty of fluids.\par \par

## 2021-06-10 LAB — BACTERIA UR CULT: NORMAL

## 2021-12-15 ENCOUNTER — APPOINTMENT (OUTPATIENT)
Dept: UROLOGY | Facility: CLINIC | Age: 56
End: 2021-12-15
Payer: COMMERCIAL

## 2021-12-15 VITALS
SYSTOLIC BLOOD PRESSURE: 121 MMHG | HEART RATE: 70 BPM | DIASTOLIC BLOOD PRESSURE: 81 MMHG | TEMPERATURE: 96.1 F | OXYGEN SATURATION: 98 %

## 2021-12-15 LAB
BILIRUB UR QL STRIP: NORMAL
CLARITY UR: CLEAR
COLLECTION METHOD: NORMAL
GLUCOSE UR-MCNC: NORMAL
HCG UR QL: 0.2 EU/DL
HGB UR QL STRIP.AUTO: NORMAL
KETONES UR-MCNC: NORMAL
LEUKOCYTE ESTERASE UR QL STRIP: NORMAL
NITRITE UR QL STRIP: NORMAL
PH UR STRIP: 5.5
PROT UR STRIP-MCNC: NORMAL
SP GR UR STRIP: >=1.03

## 2021-12-15 PROCEDURE — 81003 URINALYSIS AUTO W/O SCOPE: CPT | Mod: QW

## 2021-12-15 PROCEDURE — 51798 US URINE CAPACITY MEASURE: CPT

## 2021-12-15 PROCEDURE — 99214 OFFICE O/P EST MOD 30 MIN: CPT

## 2021-12-17 LAB — BACTERIA UR CULT: NORMAL

## 2021-12-20 NOTE — ADDENDUM
[FreeTextEntry1] : A portion of this note was written by [Naty Saxena] on 12/15/2021 acting as a scribe for Dr. Gutierres. \par \par I have personally reviewed the chart and agree that the record accurately reflects my personal performance of the history, physical exam, assessment, and plan.

## 2021-12-20 NOTE — ASSESSMENT
[FreeTextEntry1] : 57 yo female born intersex with initial CT scan demonstrating R>L extrarenal pelves and multiple small 3-4 mm stones in right kidney. He had a recent UTI and today I repeated a culture to ensure it has cleared.  I suggested he try Ellura to help with UTI prevention and that he continue to drink lots of fluid. We revisited estrogen cream which he is choosing not to use. \par \par \par Patient expressed understanding. \par \par \par

## 2021-12-20 NOTE — HISTORY OF PRESENT ILLNESS
[FreeTextEntry1] : 56F, , (born intersex), hx of multiple prior surgical procedures presenting to check PVR.  Last month he states he had a UTI and presented to [facility] -culture grew Citrobacter and was treated with Macrobid for 7 days. Today he is feeling well with no current urinary complaints or infection symptoms. Pt states that he voids q 1-2 hours as he is aware of his incomplete emptying of the bladder and tries to empty as much as possible. Additionally, he states that he takes a cranberry supplement to prevent UTIs, but is unsure of it's effectiveness.\par \par Of note; He has decided to not purse phalloplasty. Pt is not on any hormone therapies. \par \par Recent hx:  He has been taking MiraLAX in setting constipation and reports improvement in BM.  Reports improvement in mental health s/p bilateral mastectomy. He questions if he can pursue phalloplasty with current genitourinary anatomy. \par \par Of note, pt prefers to be called Shayan.\par \par Udip: negative\par PVR: 119cc (to rule out incomplete bladder emptying) \par \par UCx 2021: negative\par CT abd+ pelvis 2021: 4 mm right mid kidney nonobstructing stone, 3 mm right lower pole nonobstructing stone, 1 mm right mid kidney/lower pole nonobstructing stone,and 3 additional 1 mm right upper pole renal stones. Two upper pole 3 mm nonobstructing stones. \par 24 hour urine collection 2020: 332 mg/day urine calcium, , 0.866 uric acid (g/day)\par \par History: \par He is a former patient of Dr. Avendano. Hx of urolithiasis >25 yrs ago, small stones which likely passed.  Most recent imaging demonstrated bilateral worsening hydronephrosis with right renal lower pole stone. Prior imaging completed on 2017 notable for B/L punctate non-obstructing stones which were followed with renal US. Of note, had been on Estrogen replacement therapy since teenage years. \par \par Urinary symptoms include same sensation of incomplete bladder emptying, nocturia 1-2x. Patient reported better flow after bowel movement. Flow was "light and slow". Denied recent gross hematuria. \par \par He presented 2020 via telehealth to discuss recent 24 hour urine results. Given the density of the stones, started her on potassium citrate. He returned 3/11/2021 to discuss abd+pelvis CT scan results 2021 which demonstrated that stones have not changed in size. Had one episode of heartburn secondary to potassium citrate week prior. Had been taking potassium citrate BID until this episode and has not taken it since. Of note, pt was having a double mastectomy in two weeks. He was not taking estrogen, but was still taking progesterone. \par \par 2021 he presented with complaints of a urinary tract infection that began four days s/p double mastectomy.  Denied catheter placement at time of surgery. Reported constipation s/p surgery.  Once UTI symptoms began, he self-medicated with Macrobid BID x 7 days, which moderately improved symptoms at the time. When symptoms returned, he presented to a [facility] 2021 and UCx (+) for E.coli. UA at this time also significant for microscopic hematuria. He was then treated with Macrobid qd x 6 days. He was on sixth day of abx and still reported UTI symptoms. Last UTI was 6 months ago. He was empirically treated with 500 mg BID Cipro x 3 days. \par \par Pt also has longstanding incomplete bladder emptying complaints with worsening weak flow. Reported straining to void with full bladder. He stated that he has no upcoming surgery and is no longer on hormone replacement therapy. Denied atrophy or hot flashes. \par \par 2021: He presented for straight catheterization. We agreed on a short course of Flomax to see if that helped with bladder emptying complaints.  Pt later called moderate improvement on Flomax, specified worse when constipated.\par \par  2021 he presented for urodynamics. Urodynamics demonstrated a large capacity bladder with an interrupted flow. Evidence of valsalva voiding and impaired contractility.  The flomax does not appear to be helping much so she will stop. \par \par He presented for follow-up (2021). He stated that he was constipated the day prior and felt pubic discomfort, unsure if discomfort was vaginal or urinary. Reported discharge as well. He had since had several BM and no longer felt as "distended." He felt well during visit and stated that he had emptied well. He reported improvement in bladder symptoms. He will continue taking MiraLAX to aid BM, avoid bladder irritants, and drink plenty of fluids.\par \par

## 2022-03-24 ENCOUNTER — APPOINTMENT (OUTPATIENT)
Dept: UROLOGY | Facility: CLINIC | Age: 57
End: 2022-03-24

## 2022-04-19 ENCOUNTER — APPOINTMENT (OUTPATIENT)
Dept: UROLOGY | Facility: CLINIC | Age: 57
End: 2022-04-19
Payer: COMMERCIAL

## 2022-04-19 DIAGNOSIS — N39.0 URINARY TRACT INFECTION, SITE NOT SPECIFIED: ICD-10-CM

## 2022-04-19 LAB
BILIRUB UR QL STRIP: NORMAL
CLARITY UR: CLEAR
COLLECTION METHOD: NORMAL
GLUCOSE UR-MCNC: NORMAL
HCG UR QL: 0.2 EU/DL
HGB UR QL STRIP.AUTO: NORMAL
KETONES UR-MCNC: NORMAL
LEUKOCYTE ESTERASE UR QL STRIP: NORMAL
NITRITE UR QL STRIP: NORMAL
PH UR STRIP: 5
PROT UR STRIP-MCNC: NORMAL
SP GR UR STRIP: 1.01

## 2022-04-19 PROCEDURE — 99214 OFFICE O/P EST MOD 30 MIN: CPT

## 2022-04-19 PROCEDURE — 81003 URINALYSIS AUTO W/O SCOPE: CPT | Mod: QW

## 2022-04-19 PROCEDURE — 51798 US URINE CAPACITY MEASURE: CPT

## 2022-04-19 NOTE — PHYSICAL EXAM
[General Appearance - Well Developed] : well developed [Normal Appearance] : normal appearance [General Appearance - Well Nourished] : well nourished [Well Groomed] : well groomed [General Appearance - In No Acute Distress] : no acute distress [Urinary Bladder Findings] : the bladder was normal on palpation [Edema] : no peripheral edema [] : no respiratory distress [Exaggerated Use Of Accessory Muscles For Inspiration] : no accessory muscle use [Oriented To Time, Place, And Person] : oriented to person, place, and time [Affect] : the affect was normal [Mood] : the mood was normal [Not Anxious] : not anxious [Normal Station and Gait] : the gait and station were normal for the patient's age [No Focal Deficits] : no focal deficits

## 2022-04-21 NOTE — HISTORY OF PRESENT ILLNESS
[FreeTextEntry1] : 56F, , (born intersex), hx of multiple prior surgical procedures presenting for a follow-up. In mid February, he presented to [facility] -ucx was positive for E. coli, was treated initially with macrobid (no improvement), then treated with cefdinir. Pt developed another infection in March, treated with macrboid (no improvement) and was switched to keflex which he finished yesterday. He's been using Ellura daily. \par \par Pt was on oral lifelong estrogen until  2021 when he stopped as started process of transition.  UTI's have markedly increased since this change.  Has follow-up appt with Gyn later this week. \par \par Note: legally changed name to Shayan- prefers to be called by this name. \par \par Udip: negative \par PVR: 120 cc (to rule out incomplete bladder emptying) \par \par Recent hx: Last month he states he had a UTI and presented to [facility] -culture grew Citrobacter and was treated with Macrobid for 7 days. Today he is feeling well with no current urinary complaints or infection symptoms. Pt states that he voids q 1-2 hours as he is aware of his incomplete emptying of the bladder and tries to empty as much as possible. Additionally, he states that he takes a cranberry supplement to prevent UTIs, but is unsure of it's effectiveness.\par \par Of note; He has decided to not purse phalloplasty. Pt is not on any hormone therapies. \par \par Recent hx:  He has been taking MiraLAX in setting constipation and reports improvement in BM.  Reports improvement in mental health s/p bilateral mastectomy. He questions if he can pursue phalloplasty with current genitourinary anatomy. \par \par Of note, pt prefers to be called Shayan.\par \par Udip: negative\par PVR: 119cc (to rule out incomplete bladder emptying) \par \par UCx 2021: negative\par CT abd+ pelvis 2021: 4 mm right mid kidney nonobstructing stone, 3 mm right lower pole nonobstructing stone, 1 mm right mid kidney/lower pole nonobstructing stone,and 3 additional 1 mm right upper pole renal stones. Two upper pole 3 mm nonobstructing stones. \par 24 hour urine collection 2020: 332 mg/day urine calcium, , 0.866 uric acid (g/day)\par \par History: \par He is a former patient of Dr. Avendano. Hx of urolithiasis >25 yrs ago, small stones which likely passed.  Most recent imaging demonstrated bilateral worsening hydronephrosis with right renal lower pole stone. Prior imaging completed on 2017 notable for B/L punctate non-obstructing stones which were followed with renal US. Of note, had been on Estrogen replacement therapy since teenage years. \par \par Urinary symptoms include same sensation of incomplete bladder emptying, nocturia 1-2x. Patient reported better flow after bowel movement. Flow was "light and slow". Denied recent gross hematuria. \par \par He presented 2020 via telehealth to discuss recent 24 hour urine results. Given the density of the stones, started her on potassium citrate. He returned 3/11/2021 to discuss abd+pelvis CT scan results 2021 which demonstrated that stones have not changed in size. Had one episode of heartburn secondary to potassium citrate week prior. Had been taking potassium citrate BID until this episode and has not taken it since. Of note, pt was having a double mastectomy in two weeks. He was not taking estrogen, but was still taking progesterone. \par \par 2021 he presented with complaints of a urinary tract infection that began four days s/p double mastectomy.  Denied catheter placement at time of surgery. Reported constipation s/p surgery.  Once UTI symptoms began, he self-medicated with Macrobid BID x 7 days, which moderately improved symptoms at the time. When symptoms returned, he presented to a [facility] 2021 and UCx (+) for E.coli. UA at this time also significant for microscopic hematuria. He was then treated with Macrobid qd x 6 days. He was on sixth day of abx and still reported UTI symptoms. Last UTI was 6 months ago. He was empirically treated with 500 mg BID Cipro x 3 days. \par \par Pt also has longstanding incomplete bladder emptying complaints with worsening weak flow. Reported straining to void with full bladder. He stated that he has no upcoming surgery and is no longer on hormone replacement therapy. Denied atrophy or hot flashes. \par \par 2021: He presented for straight catheterization. We agreed on a short course of Flomax to see if that helped with bladder emptying complaints.  Pt later called moderate improvement on Flomax, specified worse when constipated.\par \par  2021 he presented for urodynamics. Urodynamics demonstrated a large capacity bladder with an interrupted flow. Evidence of valsalva voiding and impaired contractility.  The flomax does not appear to be helping much so she will stop. \par \par He presented for follow-up (2021). He stated that he was constipated the day prior and felt pubic discomfort, unsure if discomfort was vaginal or urinary. Reported discharge as well. He had since had several BM and no longer felt as "distended." He felt well during visit and stated that he had emptied well. He reported improvement in bladder symptoms. He will continue taking MiraLAX to aid BM, avoid bladder irritants, and drink plenty of fluids.\par \par

## 2022-04-21 NOTE — ADDENDUM
[FreeTextEntry1] : A portion of this note was written by [Naty Saxena] on 04/19/2022 acting as a scribe for Dr. Gutierres. \par \par I have personally reviewed the chart and agree that the record accurately reflects my personal performance of the history, physical exam, assessment, and plan.

## 2022-04-21 NOTE — ASSESSMENT
[FreeTextEntry1] : 57 yo female born intersex with small stones.  He has had multiple recurrent UTI's since stopping oral estrogen in Feb 2021.   I have suggested a return to vaginal estrogen, which he is willing to consider as the infections have become quite disruptive.   He has requested a prescription for estrace which I have provided.  HE will follow up with his gyn later this week for further care.  \par \par I sent today's urine for culture and will be in touch with the results.  \par \par Patient expressed understanding. \par \par \par

## 2022-04-25 RX ORDER — FOSFOMYCIN TROMETHAMINE 3 G/1
3 POWDER ORAL DAILY
Qty: 2 | Refills: 0 | Status: ACTIVE | COMMUNITY
Start: 2022-04-25 | End: 1900-01-01

## 2022-04-26 ENCOUNTER — NON-APPOINTMENT (OUTPATIENT)
Age: 57
End: 2022-04-26

## 2022-04-26 LAB — BACTERIA UR CULT: ABNORMAL

## 2022-04-28 LAB — BACTERIA UR CULT: NORMAL

## 2022-05-03 ENCOUNTER — NON-APPOINTMENT (OUTPATIENT)
Age: 57
End: 2022-05-03

## 2022-05-05 ENCOUNTER — LABORATORY RESULT (OUTPATIENT)
Age: 57
End: 2022-05-05

## 2022-05-10 ENCOUNTER — NON-APPOINTMENT (OUTPATIENT)
Age: 57
End: 2022-05-10

## 2022-12-21 ENCOUNTER — APPOINTMENT (OUTPATIENT)
Dept: OPHTHALMOLOGY | Facility: CLINIC | Age: 57
End: 2022-12-21

## 2023-02-15 ENCOUNTER — APPOINTMENT (OUTPATIENT)
Dept: OPHTHALMOLOGY | Facility: CLINIC | Age: 58
End: 2023-02-15
Payer: COMMERCIAL

## 2023-02-15 ENCOUNTER — NON-APPOINTMENT (OUTPATIENT)
Age: 58
End: 2023-02-15

## 2023-02-15 PROCEDURE — 92025 CPTRIZED CORNEAL TOPOGRAPHY: CPT

## 2023-02-15 PROCEDURE — 92012 INTRM OPH EXAM EST PATIENT: CPT

## 2023-02-15 PROCEDURE — 92286 ANT SGM IMG I&R SPECLR MIC: CPT

## 2023-02-15 PROCEDURE — 92250 FUNDUS PHOTOGRAPHY W/I&R: CPT

## 2023-07-24 LAB — BACTERIA UR CULT: NORMAL

## 2023-08-01 RX ORDER — ESTRADIOL 0.1 MG/G
0.1 CREAM VAGINAL
Qty: 1 | Refills: 5 | Status: ACTIVE | COMMUNITY
Start: 2022-04-19 | End: 1900-01-01

## 2024-03-08 ENCOUNTER — NON-APPOINTMENT (OUTPATIENT)
Age: 59
End: 2024-03-08

## 2024-03-27 ENCOUNTER — APPOINTMENT (OUTPATIENT)
Dept: UROLOGY | Facility: CLINIC | Age: 59
End: 2024-03-27
Payer: COMMERCIAL

## 2024-03-27 VITALS
HEART RATE: 80 BPM | TEMPERATURE: 97.3 F | OXYGEN SATURATION: 96 % | SYSTOLIC BLOOD PRESSURE: 125 MMHG | DIASTOLIC BLOOD PRESSURE: 84 MMHG

## 2024-03-27 PROCEDURE — 81003 URINALYSIS AUTO W/O SCOPE: CPT | Mod: QW

## 2024-03-27 PROCEDURE — 99214 OFFICE O/P EST MOD 30 MIN: CPT | Mod: 25

## 2024-03-27 NOTE — PHYSICAL EXAM
[General Appearance - Well Nourished] : well nourished [General Appearance - Well Developed] : well developed [Normal Appearance] : normal appearance [Well Groomed] : well groomed [General Appearance - In No Acute Distress] : no acute distress [Urinary Bladder Findings] : the bladder was normal on palpation [Edema] : no peripheral edema [] : no respiratory distress [Oriented To Time, Place, And Person] : oriented to person, place, and time [Exaggerated Use Of Accessory Muscles For Inspiration] : no accessory muscle use [Affect] : the affect was normal [Mood] : the mood was normal [Not Anxious] : not anxious [Normal Station and Gait] : the gait and station were normal for the patient's age [No Focal Deficits] : no focal deficits

## 2024-03-28 NOTE — ADDENDUM
[FreeTextEntry1] : A portion of this note was written by [Nate Bush] on 03/27/2024 acting as a scribe for Dr. Gutierres.   I have personally reviewed the chart and agree that the record accurately reflects my personal performance of the history, physical exam, assessment, and plan.

## 2024-03-28 NOTE — HISTORY OF PRESENT ILLNESS
[FreeTextEntry1] : 2024 -- Pt is a 59 yo M hx of intersex s/p multiple prior  surgical procedure, hx UTIs. Here today for f/u- patient presents with c/o mixed urinary incontinence. Pt reports DONNA- leakage when sneezing, laughing, "walking upstairs". Pt also endorses some urge/urge incontinence- but more bothered by DONNA.   Pt continues Utiva for UTI prevention- patient verbalizes x1 UTI over the past year. Pt continues estrogen cream but stopped estrogen PO.  Denies bothersome UTI today.   note: pt is physically active- currently on weightloss management.    20-- 56F, , (born intersex), hx of multiple prior surgical procedures presenting for a follow-up. In mid February, he presented to [facility] -ucx was positive for E. coli, was treated initially with macrobid (no improvement), then treated with cefdinir. Pt developed another infection in March, treated with macrboid (no improvement) and was switched to keflex which he finished yesterday. He's been using Ellura daily.   Pt was on oral lifelong estrogen until  2021 when he stopped as started process of transition.  UTI's have markedly increased since this change.  Has follow-up appt with Gyn later this week.   Note: legally changed name to Shayan- prefers to be called by this name.   Udip: negative  PVR: 120 cc (to rule out incomplete bladder emptying)   Recent hx: Last month he states he had a UTI and presented to [facility] -culture grew Citrobacter and was treated with Macrobid for 7 days. Today he is feeling well with no current urinary complaints or infection symptoms. Pt states that he voids q 1-2 hours as he is aware of his incomplete emptying of the bladder and tries to empty as much as possible. Additionally, he states that he takes a cranberry supplement to prevent UTIs, but is unsure of it's effectiveness.  Of note; He has decided to not purse phalloplasty. Pt is not on any hormone therapies.   Recent hx:  He has been taking MiraLAX in setting constipation and reports improvement in BM.  Reports improvement in mental health s/p bilateral mastectomy. He questions if he can pursue phalloplasty with current genitourinary anatomy.   Of note, pt prefers to be called Shayan.  Udip: negative PVR: 119cc (to rule out incomplete bladder emptying)   UCx 2021: negative CT abd+ pelvis 2021: 4 mm right mid kidney nonobstructing stone, 3 mm right lower pole nonobstructing stone, 1 mm right mid kidney/lower pole nonobstructing stone,and 3 additional 1 mm right upper pole renal stones. Two upper pole 3 mm nonobstructing stones.  24 hour urine collection 2020: 332 mg/day urine calcium, , 0.866 uric acid (g/day)  History:  He is a former patient of Dr. Avendano. Hx of urolithiasis >25 yrs ago, small stones which likely passed.  Most recent imaging demonstrated bilateral worsening hydronephrosis with right renal lower pole stone. Prior imaging completed on 2017 notable for B/L punctate non-obstructing stones which were followed with renal US. Of note, had been on Estrogen replacement therapy since teenage years.   Urinary symptoms include same sensation of incomplete bladder emptying, nocturia 1-2x. Patient reported better flow after bowel movement. Flow was "light and slow". Denied recent gross hematuria.   He presented 2020 via telehealth to discuss recent 24 hour urine results. Given the density of the stones, started her on potassium citrate. He returned 3/11/2021 to discuss abd+pelvis CT scan results 2021 which demonstrated that stones have not changed in size. Had one episode of heartburn secondary to potassium citrate week prior. Had been taking potassium citrate BID until this episode and has not taken it since. Of note, pt was having a double mastectomy in two weeks. He was not taking estrogen, but was still taking progesterone.   2021 he presented with complaints of a urinary tract infection that began four days s/p double mastectomy.  Denied catheter placement at time of surgery. Reported constipation s/p surgery.  Once UTI symptoms began, he self-medicated with Macrobid BID x 7 days, which moderately improved symptoms at the time. When symptoms returned, he presented to a [facility] 2021 and UCx (+) for E.coli. UA at this time also significant for microscopic hematuria. He was then treated with Macrobid qd x 6 days. He was on sixth day of abx and still reported UTI symptoms. Last UTI was 6 months ago. He was empirically treated with 500 mg BID Cipro x 3 days.   Pt also has longstanding incomplete bladder emptying complaints with worsening weak flow. Reported straining to void with full bladder. He stated that he has no upcoming surgery and is no longer on hormone replacement therapy. Denied atrophy or hot flashes.   2021: He presented for straight catheterization. We agreed on a short course of Flomax to see if that helped with bladder emptying complaints.  Pt later called moderate improvement on Flomax, specified worse when constipated.   2021 he presented for urodynamics. Urodynamics demonstrated a large capacity bladder with an interrupted flow. Evidence of valsalva voiding and impaired contractility.  The flomax does not appear to be helping much so she will stop.   He presented for follow-up (2021). He stated that he was constipated the day prior and felt pubic discomfort, unsure if discomfort was vaginal or urinary. Reported discharge as well. He had since had several BM and no longer felt as "distended." He felt well during visit and stated that he had emptied well. He reported improvement in bladder symptoms. He will continue taking MiraLAX to aid BM, avoid bladder irritants, and drink plenty of fluids.

## 2024-03-28 NOTE — ASSESSMENT
[FreeTextEntry1] : I discussed the findings and options with . LEONEL SULLIVAN in detail.   Options for management of the patient's overactive bladder and incontinence discussed at length. These included medical therapy, behavioral modification, bladder retraining, and surgical options. Surgical options for third line therapies reviewed included injection of botulinum toxin versus sacral nerve stimulation therapy.  A long discussion was held regarding the options for her predominantly stress urinary incontinence, including pelvic floor physical therapy, placement of a urethral sling, and urethral bulking agents (e.g. Bulkamid). The various procedures were described in detail with their risks and benefits. She understands that "do nothing" is an option.  Given her urethral anatomy, I do not think she is good candidate for a bulking agent.   For now, we agreed that patient will start trial of Gemtesa for 1 month to manage OAB.   She is also interested in trying PFPT but will hold on this for the time being.   Urine was sent for culture to r/o infection.   Pt will plan to return in 1 month for re-assessment. Patient expressed understanding.   The total amount of time I have personally spent preparing for this visit, reviewing the patient's test results, obtaining external history, ordering tests/medications, documenting clinical information, communicating with and counseling the patient/family and/or caregiver(s), reviewing old records, and spent face to face with the patient explaining the above was 35 minutes.

## 2024-03-29 PROBLEM — Z00.00 ENCOUNTER FOR PREVENTIVE HEALTH EXAMINATION: Status: ACTIVE | Noted: 2024-03-29

## 2024-03-29 LAB — BACTERIA UR CULT: NORMAL

## 2024-04-03 ENCOUNTER — APPOINTMENT (OUTPATIENT)
Dept: UROLOGY | Facility: CLINIC | Age: 59
End: 2024-04-03

## 2024-04-08 ENCOUNTER — TRANSCRIPTION ENCOUNTER (OUTPATIENT)
Age: 59
End: 2024-04-08

## 2024-04-16 ENCOUNTER — APPOINTMENT (OUTPATIENT)
Dept: UROLOGY | Facility: CLINIC | Age: 59
End: 2024-04-16

## 2024-04-18 ENCOUNTER — APPOINTMENT (OUTPATIENT)
Dept: UROLOGY | Facility: CLINIC | Age: 59
End: 2024-04-18

## 2024-04-19 ENCOUNTER — APPOINTMENT (OUTPATIENT)
Dept: OPHTHALMOLOGY | Facility: CLINIC | Age: 59
End: 2024-04-19
Payer: COMMERCIAL

## 2024-04-19 ENCOUNTER — NON-APPOINTMENT (OUTPATIENT)
Age: 59
End: 2024-04-19

## 2024-04-19 PROCEDURE — 92250 FUNDUS PHOTOGRAPHY W/I&R: CPT

## 2024-04-19 PROCEDURE — 92025 CPTRIZED CORNEAL TOPOGRAPHY: CPT

## 2024-04-19 PROCEDURE — 92286 ANT SGM IMG I&R SPECLR MIC: CPT

## 2024-04-19 PROCEDURE — 92014 COMPRE OPH EXAM EST PT 1/>: CPT

## 2024-04-24 ENCOUNTER — APPOINTMENT (OUTPATIENT)
Dept: UROLOGY | Facility: CLINIC | Age: 59
End: 2024-04-24
Payer: COMMERCIAL

## 2024-04-24 LAB — BACTERIA UR CULT: ABNORMAL

## 2024-04-24 PROCEDURE — 99214 OFFICE O/P EST MOD 30 MIN: CPT

## 2024-04-24 NOTE — ASSESSMENT
[FreeTextEntry1] : I discussed the findings and options with Mr. SHAYAN SULLIVAN in detail.   Recent UTI Will continue Cipro Discussed UTI and pt slow emptying likely in part due to urethral anatomy.  One option would be urethral dilation in OR followed by [facility], however in setting of approx 2-3 UTIs per year and min bother, I recommend conservative management.   Options for management of the patient's overactive bladder and incontinence discussed at length. These included medical therapy, behavioral modification, bladder retraining, and surgical options. Surgical options for third line therapies reviewed included injection of botulinum toxin versus sacral nerve stimulation therapy.  Pt would like to remain on Gemtessa, which I have ordered.    A long discussion was held regarding the options for her predominantly stress urinary incontinence, including pelvic floor physical therapy, placement of a urethral sling, and urethral bulking agents (e.g. Bulkamid). The various procedures were described in detail with their risks and benefits. She understands that "do nothing" is an option.  Given her urethral anatomy, I do not think she is good candidate for a bulking agent.   Given mult allergies, I have suggested Shayan be seen by an allergist so we know all available abx options, (cipro ok'ed by cards but not preferable given hx of aortic aneurysm per Shayan.     The total amount of time I have personally spent preparing for this visit, reviewing the patient's test results, obtaining external history, ordering tests/medications, documenting clinical information, communicating with and counseling the patient/family and/or caregiver(s), reviewing old records, and spent face to face with the patient explaining the above was 35 minutes.

## 2024-04-24 NOTE — HISTORY OF PRESENT ILLNESS
[FreeTextEntry1] : 2024 -- Pt is a 59 yo M hx of intersex s/p multiple prior  surgical procedure, hx UTIs. Here today for f/u- patient presents with c/o recent UTI.  He started on Gemtessa 1 month ago and thinks his bladder symptoms are somewhat improved.  UTI symptoms started 2 weeks ago- self started Macrobid.  Didn't clear so went to [facility], started on Cefdinir then later switched to Cipro.  Dropped off a culture here, also pos for Klebsiella sens to Cipro.  Has been on Cipro for less than 48 hours- feeling better.    Hx mixed urinary incontinence. Pt reports DONNA- leakage when sneezing, laughing, "walking upstairs". Pt also endorses some urge/urge incontinence- but more bothered by DONNA.   Pt continues Utiva for UTI prevention- patient verbalizes x1 UTI over the past year. Pt continues estrogen cream but stopped estrogen PO.  Denies bothersome UTI today.   note: pt is physically active- currently on weightloss management.    20-- 56F, , (born intersex), hx of multiple prior surgical procedures presenting for a follow-up. In mid February, he presented to [facility] -ucx was positive for E. coli, was treated initially with macrobid (no improvement), then treated with cefdinir. Pt developed another infection in March, treated with macrboid (no improvement) and was switched to keflex which he finished yesterday. He's been using Ellura daily.   Pt was on oral lifelong estrogen until  2021 when he stopped as started process of transition.  UTI's have markedly increased since this change.  Has follow-up appt with Gyn later this week.   Note: legally changed name to Shayan- prefers to be called by this name.   Udip: negative  PVR: 120 cc (to rule out incomplete bladder emptying)   Recent hx: Last month he states he had a UTI and presented to [facility] -culture grew Citrobacter and was treated with Macrobid for 7 days. Today he is feeling well with no current urinary complaints or infection symptoms. Pt states that he voids q 1-2 hours as he is aware of his incomplete emptying of the bladder and tries to empty as much as possible. Additionally, he states that he takes a cranberry supplement to prevent UTIs, but is unsure of it's effectiveness.  Of note; He has decided to not purse phalloplasty. Pt is not on any hormone therapies.   Recent hx:  He has been taking MiraLAX in setting constipation and reports improvement in BM.  Reports improvement in mental health s/p bilateral mastectomy. He questions if he can pursue phalloplasty with current genitourinary anatomy.   Of note, pt prefers to be called Shayan.  Udip: negative PVR: 119cc (to rule out incomplete bladder emptying)   UCx 2021: negative CT abd+ pelvis 2021: 4 mm right mid kidney nonobstructing stone, 3 mm right lower pole nonobstructing stone, 1 mm right mid kidney/lower pole nonobstructing stone,and 3 additional 1 mm right upper pole renal stones. Two upper pole 3 mm nonobstructing stones.  24 hour urine collection 2020: 332 mg/day urine calcium, , 0.866 uric acid (g/day)  History:  He is a former patient of Dr. Avendano. Hx of urolithiasis >25 yrs ago, small stones which likely passed.  Most recent imaging demonstrated bilateral worsening hydronephrosis with right renal lower pole stone. Prior imaging completed on 2017 notable for B/L punctate non-obstructing stones which were followed with renal US. Of note, had been on Estrogen replacement therapy since teenage years.   Urinary symptoms include same sensation of incomplete bladder emptying, nocturia 1-2x. Patient reported better flow after bowel movement. Flow was "light and slow". Denied recent gross hematuria.   He presented 2020 via telehealth to discuss recent 24 hour urine results. Given the density of the stones, started her on potassium citrate. He returned 3/11/2021 to discuss abd+pelvis CT scan results 2021 which demonstrated that stones have not changed in size. Had one episode of heartburn secondary to potassium citrate week prior. Had been taking potassium citrate BID until this episode and has not taken it since. Of note, pt was having a double mastectomy in two weeks. He was not taking estrogen, but was still taking progesterone.   2021 he presented with complaints of a urinary tract infection that began four days s/p double mastectomy.  Denied catheter placement at time of surgery. Reported constipation s/p surgery.  Once UTI symptoms began, he self-medicated with Macrobid BID x 7 days, which moderately improved symptoms at the time. When symptoms returned, he presented to a [facility] 2021 and UCx (+) for E.coli. UA at this time also significant for microscopic hematuria. He was then treated with Macrobid qd x 6 days. He was on sixth day of abx and still reported UTI symptoms. Last UTI was 6 months ago. He was empirically treated with 500 mg BID Cipro x 3 days.   Pt also has longstanding incomplete bladder emptying complaints with worsening weak flow. Reported straining to void with full bladder. He stated that he has no upcoming surgery and is no longer on hormone replacement therapy. Denied atrophy or hot flashes.   2021: He presented for straight catheterization. We agreed on a short course of Flomax to see if that helped with bladder emptying complaints.  Pt later called moderate improvement on Flomax, specified worse when constipated.   2021 he presented for urodynamics. Urodynamics demonstrated a large capacity bladder with an interrupted flow. Evidence of valsalva voiding and impaired contractility.  The flomax does not appear to be helping much so she will stop.   He presented for follow-up (2021). He stated that he was constipated the day prior and felt pubic discomfort, unsure if discomfort was vaginal or urinary. Reported discharge as well. He had since had several BM and no longer felt as "distended." He felt well during visit and stated that he had emptied well. He reported improvement in bladder symptoms. He will continue taking MiraLAX to aid BM, avoid bladder irritants, and drink plenty of fluids.

## 2024-05-02 RX ORDER — VIBEGRON 75 MG/1
75 TABLET, FILM COATED ORAL
Qty: 90 | Refills: 3 | Status: ACTIVE | COMMUNITY
Start: 2024-04-24 | End: 1900-01-01

## 2024-09-25 ENCOUNTER — APPOINTMENT (OUTPATIENT)
Dept: UROLOGY | Facility: CLINIC | Age: 59
End: 2024-09-25
Payer: COMMERCIAL

## 2024-09-25 VITALS
DIASTOLIC BLOOD PRESSURE: 84 MMHG | OXYGEN SATURATION: 95 % | SYSTOLIC BLOOD PRESSURE: 129 MMHG | HEART RATE: 68 BPM | TEMPERATURE: 97.6 F

## 2024-09-25 VITALS — BODY MASS INDEX: 36.87 KG/M2 | WEIGHT: 293 LBS

## 2024-09-25 LAB
BILIRUB UR QL STRIP: NORMAL
CLARITY UR: CLEAR
COLLECTION METHOD: NORMAL
GLUCOSE UR-MCNC: NORMAL
HCG UR QL: 0.2 EU/DL
HGB UR QL STRIP.AUTO: NORMAL
KETONES UR-MCNC: NORMAL
LEUKOCYTE ESTERASE UR QL STRIP: NORMAL
NITRITE UR QL STRIP: NORMAL
PH UR STRIP: 5
PROT UR STRIP-MCNC: NORMAL
SP GR UR STRIP: <=1.005

## 2024-09-25 PROCEDURE — 99214 OFFICE O/P EST MOD 30 MIN: CPT | Mod: 25

## 2024-09-25 PROCEDURE — 81003 URINALYSIS AUTO W/O SCOPE: CPT | Mod: QW

## 2024-09-26 NOTE — ASSESSMENT
[FreeTextEntry1] : 60 yo male with complex urolgical hx and mixed incontinence.  DONNA treatment is not an option, UUI is well managed on Gemtesa.  He will continue Gemtesa as well as transvaginal estrogen and see me back in 1 year.    The total amount of time I have personally spent preparing for this visit, reviewing the patient's test results, obtaining external history, ordering tests/medications, documenting clinical information, communicating with and counseling the patient/family and/or caregiver(s), reviewing old records, and spent face to face with the patient explaining the above was 35 minutes.

## 2024-09-26 NOTE — HISTORY OF PRESENT ILLNESS
[FreeTextEntry1] : 2024 --  Pt is a 59 yo M hx of intersex s/p multiple prior  surgical procedure, hx UTIs. Hx of OAB and DONNA. Started Gemtesa 75 mg 6 months ago. Decided not to move forward with any procedure for DONNA. Presents today for f/u. Has been taking gemtesa consistently and reports improvement in Urgency/UUI. Pt reports has had 2 infections this past 6 months which were treated with abx and symptoms went away. Still bothered by DONNA, no PPD. Still takes Utiva for UTI prevention and estrace cream. Started weight loss medication about 6 months ago.   2024 -- Pt is a 59 yo M hx of intersex s/p multiple prior  surgical procedure, hx UTIs. Here today for f/u- patient presents with c/o recent UTI. He started on Gemtessa 1 month ago and thinks his bladder symptoms are somewhat improved. UTI symptoms started 2 weeks ago- self started Macrobid. Didn't clear so went to [facility], started on Cefdinir then later switched to Cipro. Dropped off a culture here, also pos for Klebsiella sens to Cipro. Has been on Cipro for less than 48 hours- feeling better.  Hx mixed urinary incontinence. Pt reports DONNA- leakage when sneezing, laughing, "walking upstairs". Pt also endorses some urge/urge incontinence- but more bothered by DONNA.  Pt continues Utiva for UTI prevention- patient verbalizes x1 UTI over the past year. Pt continues estrogen cream but stopped estrogen PO. Denies bothersome UTI today.  note: pt is physically active- currently on weightloss management.   20-- 56F, , (born intersex), hx of multiple prior surgical procedures presenting for a follow-up. In mid February, he presented to [facility] -ucx was positive for E. coli, was treated initially with macrobid (no improvement), then treated with cefdinir. Pt developed another infection in March, treated with macrboid (no improvement) and was switched to keflex which he finished yesterday. He's been using Ellura daily.  Pt was on oral lifelong estrogen until 2021 when he stopped as started process of transition. UTI's have markedly increased since this change. Has follow-up appt with Gyn later this week.  Note: legally changed name to Shayan- prefers to be called by this name.  Udip: negative PVR: 120 cc (to rule out incomplete bladder emptying)  Recent hx: Last month he states he had a UTI and presented to [facility] -culture grew Citrobacter and was treated with Macrobid for 7 days. Today he is feeling well with no current urinary complaints or infection symptoms. Pt states that he voids q 1-2 hours as he is aware of his incomplete emptying of the bladder and tries to empty as much as possible. Additionally, he states that he takes a cranberry supplement to prevent UTIs, but is unsure of it's effectiveness.  Of note; He has decided to not purse phalloplasty. Pt is not on any hormone therapies.  Recent hx: He has been taking MiraLAX in setting constipation and reports improvement in BM. Reports improvement in mental health s/p bilateral mastectomy. He questions if he can pursue phalloplasty with current genitourinary anatomy.  Of note, pt prefers to be called Shayan.  Udip: negative PVR: 119cc (to rule out incomplete bladder emptying)  UCx 2021: negative CT abd+ pelvis 2021: 4 mm right mid kidney nonobstructing stone, 3 mm right lower pole nonobstructing stone, 1 mm right mid kidney/lower pole nonobstructing stone,and 3 additional 1 mm right upper pole renal stones. Two upper pole 3 mm nonobstructing stones. 24 hour urine collection 2020: 332 mg/day urine calcium, , 0.866 uric acid (g/day)  History: He is a former patient of Dr. Avendano. Hx of urolithiasis >25 yrs ago, small stones which likely passed. Most recent imaging demonstrated bilateral worsening hydronephrosis with right renal lower pole stone. Prior imaging completed on 2017 notable for B/L punctate non-obstructing stones which were followed with renal US. Of note, had been on Estrogen replacement therapy since teenage years.  Urinary symptoms include same sensation of incomplete bladder emptying, nocturia 1-2x. Patient reported better flow after bowel movement. Flow was "light and slow". Denied recent gross hematuria.  He presented 2020 via telehealth to discuss recent 24 hour urine results. Given the density of the stones, started her on potassium citrate. He returned 3/11/2021 to discuss abd+pelvis CT scan results 2021 which demonstrated that stones have not changed in size. Had one episode of heartburn secondary to potassium citrate week prior. Had been taking potassium citrate BID until this episode and has not taken it since. Of note, pt was having a double mastectomy in two weeks. He was not taking estrogen, but was still taking progesterone.  2021 he presented with complaints of a urinary tract infection that began four days s/p double mastectomy. Denied catheter placement at time of surgery. Reported constipation s/p surgery. Once UTI symptoms began, he self-medicated with Macrobid BID x 7 days, which moderately improved symptoms at the time. When symptoms returned, he presented to a [facility] 2021 and UCx (+) for E.coli. UA at this time also significant for microscopic hematuria. He was then treated with Macrobid qd x 6 days. He was on sixth day of abx and still reported UTI symptoms. Last UTI was 6 months ago. He was empirically treated with 500 mg BID Cipro x 3 days.  Pt also has longstanding incomplete bladder emptying complaints with worsening weak flow. Reported straining to void with full bladder. He stated that he has no upcoming surgery and is no longer on hormone replacement therapy. Denied atrophy or hot flashes.  2021: He presented for straight catheterization. We agreed on a short course of Flomax to see if that helped with bladder emptying complaints. Pt later called moderate improvement on Flomax, specified worse when constipated.  2021 he presented for urodynamics. Urodynamics demonstrated a large capacity bladder with an interrupted flow. Evidence of valsalva voiding and impaired contractility. The flomax does not appear to be helping much so she will stop.  He presented for follow-up (2021). He stated that he was constipated the day prior and felt pubic discomfort, unsure if discomfort was vaginal or urinary. Reported discharge as well. He had since had several BM and no longer felt as "distended." He felt well during visit and stated that he had emptied well. He reported improvement in bladder symptoms. He will continue taking MiraLAX to aid BM, avoid bladder irritants, and drink plenty of fluids.

## 2024-09-26 NOTE — ASSESSMENT
[FreeTextEntry1] : 58 yo male with complex urolgical hx and mixed incontinence.  DONNA treatment is not an option, UUI is well managed on Gemtesa.  He will continue Gemtesa as well as transvaginal estrogen and see me back in 1 year.    The total amount of time I have personally spent preparing for this visit, reviewing the patient's test results, obtaining external history, ordering tests/medications, documenting clinical information, communicating with and counseling the patient/family and/or caregiver(s), reviewing old records, and spent face to face with the patient explaining the above was 35 minutes.

## 2024-09-26 NOTE — HISTORY OF PRESENT ILLNESS
[FreeTextEntry1] : 2024 --  Pt is a 57 yo M hx of intersex s/p multiple prior  surgical procedure, hx UTIs. Hx of OAB and DONNA. Started Gemtesa 75 mg 6 months ago. Decided not to move forward with any procedure for DONNA. Presents today for f/u. Has been taking gemtesa consistently and reports improvement in Urgency/UUI. Pt reports has had 2 infections this past 6 months which were treated with abx and symptoms went away. Still bothered by DONNA, no PPD. Still takes Utiva for UTI prevention and estrace cream. Started weight loss medication about 6 months ago.   2024 -- Pt is a 57 yo M hx of intersex s/p multiple prior  surgical procedure, hx UTIs. Here today for f/u- patient presents with c/o recent UTI. He started on Gemtessa 1 month ago and thinks his bladder symptoms are somewhat improved. UTI symptoms started 2 weeks ago- self started Macrobid. Didn't clear so went to [facility], started on Cefdinir then later switched to Cipro. Dropped off a culture here, also pos for Klebsiella sens to Cipro. Has been on Cipro for less than 48 hours- feeling better.  Hx mixed urinary incontinence. Pt reports DONNA- leakage when sneezing, laughing, "walking upstairs". Pt also endorses some urge/urge incontinence- but more bothered by DONNA.  Pt continues Utiva for UTI prevention- patient verbalizes x1 UTI over the past year. Pt continues estrogen cream but stopped estrogen PO. Denies bothersome UTI today.  note: pt is physically active- currently on weightloss management.   22-- 56F, , (born intersex), hx of multiple prior surgical procedures presenting for a follow-up. In mid February, he presented to [facility] -ucx was positive for E. coli, was treated initially with macrobid (no improvement), then treated with cefdinir. Pt developed another infection in March, treated with macrboid (no improvement) and was switched to keflex which he finished yesterday. He's been using Ellura daily.  Pt was on oral lifelong estrogen until 2021 when he stopped as started process of transition. UTI's have markedly increased since this change. Has follow-up appt with Gyn later this week.  Note: legally changed name to Shayan- prefers to be called by this name.  Udip: negative PVR: 120 cc (to rule out incomplete bladder emptying)  Recent hx: Last month he states he had a UTI and presented to [facility] -culture grew Citrobacter and was treated with Macrobid for 7 days. Today he is feeling well with no current urinary complaints or infection symptoms. Pt states that he voids q 1-2 hours as he is aware of his incomplete emptying of the bladder and tries to empty as much as possible. Additionally, he states that he takes a cranberry supplement to prevent UTIs, but is unsure of it's effectiveness.  Of note; He has decided to not purse phalloplasty. Pt is not on any hormone therapies.  Recent hx: He has been taking MiraLAX in setting constipation and reports improvement in BM. Reports improvement in mental health s/p bilateral mastectomy. He questions if he can pursue phalloplasty with current genitourinary anatomy.  Of note, pt prefers to be called Shayan.  Udip: negative PVR: 119cc (to rule out incomplete bladder emptying)  UCx 2021: negative CT abd+ pelvis 2021: 4 mm right mid kidney nonobstructing stone, 3 mm right lower pole nonobstructing stone, 1 mm right mid kidney/lower pole nonobstructing stone,and 3 additional 1 mm right upper pole renal stones. Two upper pole 3 mm nonobstructing stones. 24 hour urine collection 2020: 332 mg/day urine calcium, , 0.866 uric acid (g/day)  History: He is a former patient of Dr. Avendano. Hx of urolithiasis >25 yrs ago, small stones which likely passed. Most recent imaging demonstrated bilateral worsening hydronephrosis with right renal lower pole stone. Prior imaging completed on 2017 notable for B/L punctate non-obstructing stones which were followed with renal US. Of note, had been on Estrogen replacement therapy since teenage years.  Urinary symptoms include same sensation of incomplete bladder emptying, nocturia 1-2x. Patient reported better flow after bowel movement. Flow was "light and slow". Denied recent gross hematuria.  He presented 2020 via telehealth to discuss recent 24 hour urine results. Given the density of the stones, started her on potassium citrate. He returned 3/11/2021 to discuss abd+pelvis CT scan results 2021 which demonstrated that stones have not changed in size. Had one episode of heartburn secondary to potassium citrate week prior. Had been taking potassium citrate BID until this episode and has not taken it since. Of note, pt was having a double mastectomy in two weeks. He was not taking estrogen, but was still taking progesterone.  2021 he presented with complaints of a urinary tract infection that began four days s/p double mastectomy. Denied catheter placement at time of surgery. Reported constipation s/p surgery. Once UTI symptoms began, he self-medicated with Macrobid BID x 7 days, which moderately improved symptoms at the time. When symptoms returned, he presented to a [facility] 2021 and UCx (+) for E.coli. UA at this time also significant for microscopic hematuria. He was then treated with Macrobid qd x 6 days. He was on sixth day of abx and still reported UTI symptoms. Last UTI was 6 months ago. He was empirically treated with 500 mg BID Cipro x 3 days.  Pt also has longstanding incomplete bladder emptying complaints with worsening weak flow. Reported straining to void with full bladder. He stated that he has no upcoming surgery and is no longer on hormone replacement therapy. Denied atrophy or hot flashes.  2021: He presented for straight catheterization. We agreed on a short course of Flomax to see if that helped with bladder emptying complaints. Pt later called moderate improvement on Flomax, specified worse when constipated.  2021 he presented for urodynamics. Urodynamics demonstrated a large capacity bladder with an interrupted flow. Evidence of valsalva voiding and impaired contractility. The flomax does not appear to be helping much so she will stop.  He presented for follow-up (2021). He stated that he was constipated the day prior and felt pubic discomfort, unsure if discomfort was vaginal or urinary. Reported discharge as well. He had since had several BM and no longer felt as "distended." He felt well during visit and stated that he had emptied well. He reported improvement in bladder symptoms. He will continue taking MiraLAX to aid BM, avoid bladder irritants, and drink plenty of fluids.